# Patient Record
Sex: FEMALE | Race: WHITE | HISPANIC OR LATINO | URBAN - METROPOLITAN AREA
[De-identification: names, ages, dates, MRNs, and addresses within clinical notes are randomized per-mention and may not be internally consistent; named-entity substitution may affect disease eponyms.]

---

## 2017-05-07 ENCOUNTER — EMERGENCY (EMERGENCY)
Facility: HOSPITAL | Age: 32
LOS: 1 days | Discharge: HOME | End: 2017-05-07
Attending: EMERGENCY MEDICINE
Payer: MEDICAID

## 2017-05-07 VITALS
RESPIRATION RATE: 16 BRPM | DIASTOLIC BLOOD PRESSURE: 78 MMHG | SYSTOLIC BLOOD PRESSURE: 116 MMHG | TEMPERATURE: 98 F | HEART RATE: 78 BPM | WEIGHT: 293 LBS | HEIGHT: 64 IN | OXYGEN SATURATION: 99 %

## 2017-05-07 DIAGNOSIS — O34.219 MATERNAL CARE FOR UNSPECIFIED TYPE SCAR FROM PREVIOUS CESAREAN DELIVERY: Chronic | ICD-10-CM

## 2017-05-07 DIAGNOSIS — H66.001 ACUTE SUPPURATIVE OTITIS MEDIA WITHOUT SPONTANEOUS RUPTURE OF EAR DRUM, RIGHT EAR: ICD-10-CM

## 2017-05-07 DIAGNOSIS — J02.9 ACUTE PHARYNGITIS, UNSPECIFIED: ICD-10-CM

## 2017-05-07 PROBLEM — Z00.00 ENCOUNTER FOR PREVENTIVE HEALTH EXAMINATION: Status: ACTIVE | Noted: 2017-05-07

## 2017-05-07 PROCEDURE — 99283 EMERGENCY DEPT VISIT LOW MDM: CPT | Mod: 25

## 2017-05-07 PROCEDURE — 99283 EMERGENCY DEPT VISIT LOW MDM: CPT

## 2017-05-07 RX ORDER — AZITHROMYCIN 500 MG/1
1 TABLET, FILM COATED ORAL
Qty: 6 | Refills: 0 | OUTPATIENT
Start: 2017-05-07 | End: 2017-05-12

## 2017-05-07 RX ORDER — MOMETASONE FUROATE 50 UG/1
2 SPRAY NASAL
Qty: 1 | Refills: 0 | OUTPATIENT
Start: 2017-05-07 | End: 2017-06-06

## 2017-05-07 NOTE — ED STATDOCS - CARE PLAN
Principal Discharge DX:	Pharyngitis, unspecified etiology  Secondary Diagnosis:	Acute suppurative otitis media of right ear without spontaneous rupture of tympanic membrane, recurrence not specified

## 2017-05-07 NOTE — ED STATDOCS - MEDICAL DECISION MAKING DETAILS
sore throat with reduced light reflect to right ear. Will treat with zithromax, as atypical infeciton is likely.

## 2017-07-14 NOTE — ED ADULT NURSE NOTE - CAS ELECT INFOMATION PROVIDED
I will START or STAY ON the medications listed below when I get home from the hospital:    ibuprofen 600 mg oral tablet  -- 1 tab(s) by mouth every 6 hours, As needed, moderate pain  -- Indication: For pain    clonazePAM 0.5 mg oral tablet  -- 1 tab(s) by mouth 3 times a day  -- Indication: For pain    ALPRAZolam 0.5 mg oral tablet  -- 1 tab(s) by mouth 3 times a day  -- Indication: For Anxiety    Iron 100 Plus  --  by mouth   -- Indication: For Anemia I will START or STAY ON the medications listed below when I get home from the hospital:    ibuprofen 600 mg oral tablet  -- 1 tab(s) by mouth every 6 hours, As needed, moderate pain  -- Indication: For pain    clonazePAM 0.5 mg oral tablet  -- 1 tab(s) by mouth 3 times a day  -- Indication: For pain    doxycycline monohydrate 100 mg oral capsule  -- 1 cap(s) by mouth 2 times a day x 5 days  -- Indication: For Antibiotic    ALPRAZolam 0.5 mg oral tablet  -- 1 tab(s) by mouth 3 times a day  -- Indication: For Anxiety    Iron 100 Plus  --  by mouth   -- Indication: For Anemia DC instructions

## 2018-09-11 ENCOUNTER — TRANSCRIPTION ENCOUNTER (OUTPATIENT)
Age: 33
End: 2018-09-11

## 2018-11-27 NOTE — ED ADULT TRIAGE NOTE - HEART RATE (BEATS/MIN)
=================================================================

Del Sum A-C

=================================================================

Datetime Report Generated by CPN: 2018 13:38

   

   

=================================================================

DELIVERY PERSONNEL

=================================================================

   

DELIVERY PERSONNEL:  [id number]

Delivery Doctor::  Sandee Dukes MD

Labor and Delivery Nurse::  Monique Cano RN

Scrub Tech/CNA:  Toño Ahn CNA

Scrub Tech/CNA:  Olu Do CNA

Additional Personnel: :  Julianna Hill, RN

   

=================================================================

MATERNAL INFORMATION

=================================================================

   

Delivery Anesthesia:  None

Medications After Delivery:  Pitocin Drip 20 Units/1000ml NSS

Estimated  Blood Loss (ml):  300

Maternal Complications:  Precipitous Labor (<3hrs); Abruptio Placenta

Complication Details:  partial placental abruption

Provider Comments:  dark clotted blood with passage of placenta c/w a

   possible partial abruption

   

=================================================================

LABOR SUMMARY

=================================================================

   

EDC:  2018 00:00

No. Babies in Womb:  1

 Attempted:  No

Labor Anesthesia:  None

   

=================================================================

LABOR INFORMATION

=================================================================

   

Reason for Induction:  Not Applicable

Onset of Labor:  2018 03:15

Complete Dilatation:  2018 05:43

Oxytocin:  Augmentation

Group B Beta Strep:  unknown

Antibiotics # of Doses:  1

Antibiotics Time of Last Dose:  0316

Name of Antibiotic Given:  Penicillin

Steroids Given:  None

Reason Steroids Not Administered:  Not Applicable

   

=================================================================

MEMBRANES

=================================================================

   

Membranes Rupture Method:  Artificial

Membranes Rupture Method:  Spontaneous

Membranes Rupture Method:  Spontaneous

Rupture of Membranes:  2018 03:15 (Annotations: Per Dr. Dukes

   sve assessment )

Length of Rupture (hr):  2.52

Amniotic Fluid Color:  Clear

Amniotic Fluid Amount:  Small

Amniotic Fluid Odor:  Normal

   

=================================================================

STAGES OF LABOR

=================================================================

   

Stage 1 hr:  2

Stage 1 min:  28

Stage 2 hr:  0

Stage 2 min:  3

Stage 3 hr:  0

Stage 3 min:  4

Total Time in Labor hr:  2

Total Time in Labor min:  35

   

=================================================================

VAGINAL DELIVERY

=================================================================

   

Episiotomy:  None

Laceration #1:  None

Laceration Extension #1:  N/A

Laceration Repair:  Not Applicable

Sponge Count Correct:  N/A

Sharps Count Correct:  N/A

   

=================================================================

CSECTION DELIVERY

=================================================================

   

Primary Indication:  N/A

Secondary Indication:  N/A

CSection Incidence:  N/A

Labor:  N/A

Elective:  N/A

CSection Incision:  N/A

   

=================================================================

BABY A INFORMATION

=================================================================

   

Infant Delivery Date/Time:  2018 05:46

Method of Delivery:  Vaginal

Method of Delivery:  Vaginal

Born in Route :  No

:  N/A

Forceps:  N/A

Vacuum Extraction:  N/A

Shoulder Dystocia :  No

   

=================================================================

PRESENTATION/POSITION BABY A

=================================================================

   

Presentation:  Cephalic

Cephalic Presentation:  Vertex

Vertex Position:  Right Occipital Anterior

Breech Presentation:  N/A

   

=================================================================

PLACENTA INFORMATION BABY A

=================================================================

   

Placenta Delivery Time :  2018 05:50

Placenta Method of Delivery:  Spontaneous

Placenta Method of Delivery:  Spontaneous

Placenta Status:  Delivered

   

=================================================================

APGAR SCORES BABY A

=================================================================

   

Heart Rate 1 min:  >100 bpm

Resp Effort 1 min:  Good Cry

Reflex Irritability 1 min:  Cough or Sneeze or Pulls Away

Muscle Tone 1 min:  Active Motion

Color 1 min:  Blue/Pale

Resuscitation Effort 1 min:  Tactile Stimulation

APGAR SCORE 1 MIN:  8

Heart Rate 5 min:  >100 bpm

Resp Effort 5 min:  Good Cry

Reflex Irritability 5 min:  Cough or Sneeze or Pulls Away

Muscle Tone 5 min:  Active Motion

Color 5 min:  Body Pink, Extremities Blue

APGAR SCORE 5 MIN:  9

   

=================================================================

INFANT INFORMATION BABY A

=================================================================

   

Gestational Age at Delivery:  36.5

Gestational Status:  Late - 34- 36.6 Weeks

Infant Outcome :  Liveborn

Infant Condition :  Stable

Infant Sex:  Male

Infant Sex:  Male

   

=================================================================

IDENTIFICATION BABY A

=================================================================

   

Infant Verification Date/Time:  2018 06:00

ID Band Number:  [id number]

Mother's Name Verified:  Yes

Infant Medical Record Number:  [id number]

RN Verifying Infant:  MARIANNE Cano RN

Additional Verifying Personnel:  RGraham Ahn CNA

   

=================================================================

WEIGHT/LENGTH BABY A

=================================================================

   

Infant Birthweight (gm):  2520

Infant Weight (lb):  5

Infant Weight (oz):  9

Infant Length (in):  18.50

Infant Length (cm):  46.99

   

=================================================================

CORD INFORMATION BABY A

=================================================================

   

No. Cord Vessels:  3

Nuchal Cord :  N/A

Cord Blood Taken:  Yes-For Storage (Mom's Blood type +)

Infant Suction:  None

   

=================================================================

ASSESSMENT BABY A

=================================================================

   

Infant Complications:  Multiple Variable Decels

Physical Findings at Delivery:  Within Normal Limits

Infant Respirations:  Appears Normal

Skin to Skin:  No

Neonatologist/ALS Called :  No

Infant Care By:  MARIANNE Medrano RN

Infant Care By:  MARIANNE Medrano RN

Transferred To:  Remains with Mother

Transferred To:   Nursery

   

=================================================================

BABY B INFORMATION

=================================================================

   

 :  N/A

   

=================================================================

SIGNATURES

=================================================================

   

Signature:  Electronically signed by Sandee Dukes MD ([facility]) on

   2018 at 05:55  with User ID: [email or web address] [id number]

## 2022-01-15 ENCOUNTER — TRANSCRIPTION ENCOUNTER (OUTPATIENT)
Age: 37
End: 2022-01-15

## 2022-02-03 ENCOUNTER — TRANSCRIPTION ENCOUNTER (OUTPATIENT)
Age: 37
End: 2022-02-03

## 2022-02-03 ENCOUNTER — EMERGENCY (EMERGENCY)
Facility: HOSPITAL | Age: 37
LOS: 1 days | Discharge: HOME | End: 2022-02-03
Attending: EMERGENCY MEDICINE
Payer: MEDICAID

## 2022-02-03 VITALS
OXYGEN SATURATION: 98 % | DIASTOLIC BLOOD PRESSURE: 80 MMHG | RESPIRATION RATE: 18 BRPM | HEART RATE: 72 BPM | HEIGHT: 64 IN | SYSTOLIC BLOOD PRESSURE: 133 MMHG | TEMPERATURE: 99 F | WEIGHT: 235.01 LBS

## 2022-02-03 DIAGNOSIS — O34.219 MATERNAL CARE FOR UNSPECIFIED TYPE SCAR FROM PREVIOUS CESAREAN DELIVERY: Chronic | ICD-10-CM

## 2022-02-03 DIAGNOSIS — Z98.84 BARIATRIC SURGERY STATUS: Chronic | ICD-10-CM

## 2022-02-03 LAB
ALBUMIN SERPL ELPH-MCNC: 4 G/DL — SIGNIFICANT CHANGE UP (ref 3.3–5.2)
ALP SERPL-CCNC: 111 U/L — SIGNIFICANT CHANGE UP (ref 40–120)
ALT FLD-CCNC: 33 U/L — HIGH
ANION GAP SERPL CALC-SCNC: 15 MMOL/L — SIGNIFICANT CHANGE UP (ref 5–17)
APPEARANCE UR: CLEAR — SIGNIFICANT CHANGE UP
AST SERPL-CCNC: 22 U/L — SIGNIFICANT CHANGE UP
BACTERIA # UR AUTO: ABNORMAL
BASOPHILS # BLD AUTO: 0.03 K/UL — SIGNIFICANT CHANGE UP (ref 0–0.2)
BASOPHILS NFR BLD AUTO: 0.2 % — SIGNIFICANT CHANGE UP (ref 0–2)
BILIRUB SERPL-MCNC: 0.6 MG/DL — SIGNIFICANT CHANGE UP (ref 0.4–2)
BILIRUB UR-MCNC: NEGATIVE — SIGNIFICANT CHANGE UP
BUN SERPL-MCNC: 13.5 MG/DL — SIGNIFICANT CHANGE UP (ref 8–20)
CALCIUM SERPL-MCNC: 8.6 MG/DL — SIGNIFICANT CHANGE UP (ref 8.6–10.2)
CHLORIDE SERPL-SCNC: 105 MMOL/L — SIGNIFICANT CHANGE UP (ref 98–107)
CO2 SERPL-SCNC: 18 MMOL/L — LOW (ref 22–29)
COLOR SPEC: YELLOW — SIGNIFICANT CHANGE UP
CREAT SERPL-MCNC: 0.88 MG/DL — SIGNIFICANT CHANGE UP (ref 0.5–1.3)
DIFF PNL FLD: ABNORMAL
EOSINOPHIL # BLD AUTO: 0.03 K/UL — SIGNIFICANT CHANGE UP (ref 0–0.5)
EOSINOPHIL NFR BLD AUTO: 0.2 % — SIGNIFICANT CHANGE UP (ref 0–6)
EPI CELLS # UR: ABNORMAL
GLUCOSE SERPL-MCNC: 121 MG/DL — HIGH (ref 70–99)
GLUCOSE UR QL: NEGATIVE MG/DL — SIGNIFICANT CHANGE UP
HCG SERPL-ACNC: <4 MIU/ML — SIGNIFICANT CHANGE UP
HCT VFR BLD CALC: 34.6 % — SIGNIFICANT CHANGE UP (ref 34.5–45)
HGB BLD-MCNC: 11.4 G/DL — LOW (ref 11.5–15.5)
IMM GRANULOCYTES NFR BLD AUTO: 0.4 % — SIGNIFICANT CHANGE UP (ref 0–1.5)
KETONES UR-MCNC: ABNORMAL
LEUKOCYTE ESTERASE UR-ACNC: NEGATIVE — SIGNIFICANT CHANGE UP
LIDOCAIN IGE QN: 22 U/L — SIGNIFICANT CHANGE UP (ref 22–51)
LYMPHOCYTES # BLD AUTO: 1.68 K/UL — SIGNIFICANT CHANGE UP (ref 1–3.3)
LYMPHOCYTES # BLD AUTO: 11.9 % — LOW (ref 13–44)
MCHC RBC-ENTMCNC: 26.9 PG — LOW (ref 27–34)
MCHC RBC-ENTMCNC: 32.9 GM/DL — SIGNIFICANT CHANGE UP (ref 32–36)
MCV RBC AUTO: 81.6 FL — SIGNIFICANT CHANGE UP (ref 80–100)
MONOCYTES # BLD AUTO: 0.63 K/UL — SIGNIFICANT CHANGE UP (ref 0–0.9)
MONOCYTES NFR BLD AUTO: 4.5 % — SIGNIFICANT CHANGE UP (ref 2–14)
NEUTROPHILS # BLD AUTO: 11.68 K/UL — HIGH (ref 1.8–7.4)
NEUTROPHILS NFR BLD AUTO: 82.8 % — HIGH (ref 43–77)
NITRITE UR-MCNC: NEGATIVE — SIGNIFICANT CHANGE UP
PH UR: 6 — SIGNIFICANT CHANGE UP (ref 5–8)
PLATELET # BLD AUTO: 281 K/UL — SIGNIFICANT CHANGE UP (ref 150–400)
POTASSIUM SERPL-MCNC: 3.6 MMOL/L — SIGNIFICANT CHANGE UP (ref 3.5–5.3)
POTASSIUM SERPL-SCNC: 3.6 MMOL/L — SIGNIFICANT CHANGE UP (ref 3.5–5.3)
PROT SERPL-MCNC: 7 G/DL — SIGNIFICANT CHANGE UP (ref 6.6–8.7)
PROT UR-MCNC: NEGATIVE — SIGNIFICANT CHANGE UP
RBC # BLD: 4.24 M/UL — SIGNIFICANT CHANGE UP (ref 3.8–5.2)
RBC # FLD: 13.2 % — SIGNIFICANT CHANGE UP (ref 10.3–14.5)
RBC CASTS # UR COMP ASSIST: ABNORMAL /HPF (ref 0–4)
SODIUM SERPL-SCNC: 138 MMOL/L — SIGNIFICANT CHANGE UP (ref 135–145)
SP GR SPEC: 1.02 — SIGNIFICANT CHANGE UP (ref 1.01–1.02)
UROBILINOGEN FLD QL: NEGATIVE MG/DL — SIGNIFICANT CHANGE UP
WBC # BLD: 14.1 K/UL — HIGH (ref 3.8–10.5)
WBC # FLD AUTO: 14.1 K/UL — HIGH (ref 3.8–10.5)
WBC UR QL: SIGNIFICANT CHANGE UP /HPF (ref 0–5)

## 2022-02-03 PROCEDURE — 99285 EMERGENCY DEPT VISIT HI MDM: CPT

## 2022-02-03 RX ORDER — SODIUM CHLORIDE 9 MG/ML
1000 INJECTION INTRAMUSCULAR; INTRAVENOUS; SUBCUTANEOUS ONCE
Refills: 0 | Status: COMPLETED | OUTPATIENT
Start: 2022-02-03 | End: 2022-02-03

## 2022-02-03 RX ORDER — MORPHINE SULFATE 50 MG/1
4 CAPSULE, EXTENDED RELEASE ORAL ONCE
Refills: 0 | Status: DISCONTINUED | OUTPATIENT
Start: 2022-02-03 | End: 2022-02-03

## 2022-02-03 RX ORDER — KETOROLAC TROMETHAMINE 30 MG/ML
15 SYRINGE (ML) INJECTION ONCE
Refills: 0 | Status: DISCONTINUED | OUTPATIENT
Start: 2022-02-03 | End: 2022-02-03

## 2022-02-03 RX ORDER — ONDANSETRON 8 MG/1
4 TABLET, FILM COATED ORAL ONCE
Refills: 0 | Status: COMPLETED | OUTPATIENT
Start: 2022-02-03 | End: 2022-02-03

## 2022-02-03 RX ADMIN — MORPHINE SULFATE 4 MILLIGRAM(S): 50 CAPSULE, EXTENDED RELEASE ORAL at 21:28

## 2022-02-03 RX ADMIN — ONDANSETRON 4 MILLIGRAM(S): 8 TABLET, FILM COATED ORAL at 21:49

## 2022-02-03 RX ADMIN — ONDANSETRON 4 MILLIGRAM(S): 8 TABLET, FILM COATED ORAL at 21:28

## 2022-02-03 RX ADMIN — SODIUM CHLORIDE 1000 MILLILITER(S): 9 INJECTION INTRAMUSCULAR; INTRAVENOUS; SUBCUTANEOUS at 21:28

## 2022-02-03 RX ADMIN — Medication 15 MILLIGRAM(S): at 21:49

## 2022-02-03 NOTE — ED PROVIDER NOTE - PATIENT PORTAL LINK FT
You can access the FollowMyHealth Patient Portal offered by [facility] by registering at the following website: [email or web address]. By joining [facility]’s FollowMyHealth portal, you will also be able to view your health information using other applications (apps) compatible with our system.

## 2022-02-03 NOTE — ED PROVIDER NOTE - PHYSICAL EXAMINATION
Constitutional: Awake, Alert. NAD. uncomfortable due to pain. Cooperative. VSS.  HEAD: NC/AT; no signs of trauma   EYES: Conjunctiva and sclera are clear bilaterally.   NECK: FROM.  CARDIOVASCULAR: RRR.  RESPIRATORY: Speaking full sentences. Normal respiratory effort  ABDOMEN: Soft; +ttp LLQ +L CVAT. +elevated bmi  EXTREMITIES: Full active ROM in all extremities; no deformities; non-tender to palpation; no edema, no crepitus or step off;  distal pulses palpable and symmetric.  SKIN: Warm, dry; good skin turgor, no apparent lesions or rashes, no ecchymosis, brisk capillary refill.  NEURO: AAOx3 follows commands. No facial droop. CN 2-12 grossly intact. No truncal ataxia. Steady gait. Moving all extrem spont

## 2022-02-03 NOTE — ED PROVIDER NOTE - OBJECTIVE STATEMENT
37 yo F PMH gastric sleeve, gastric bypass, C sections x2, nephrolithiasis dx'ed 2 weeks ago at [facility] with UTI s/p keflex regimen and  f/u yesterday p/w worsening left flank pain radiating to LLQ x today a/w nausea and nbnb vomiting. admits chronic diarrhea since bypass surgery. denies fever, cp, sob.

## 2022-02-03 NOTE — ED PROVIDER NOTE - NSFOLLOWUPINSTRUCTIONS_ED_ALL_ED_FT
- Please follow up with your Primary Care Doctor in 24-48 hr.  - Seek immediate medical care for any new, worsening or concerning signs or symptoms.   - Stay well hydrated, drink lots of liquids   - Take medications as directed, be sure to read all instructions on packaging     - Take ibuprofen (Motrin) 600mg every 6 hours for pain     - Take Tylenol 650 mg every 6 hours for pain      - Take oxycodone 5 mg every 6 hours for severe pain  - You were given copies of all your test results, please bring to your primary care doctor for review of any abnormal test results  - Follow up with Urologist in 1-2 days for KIDNEY STONE  - Strain your urine and bring stone to urologist for pathologic evaluation.   Feel better!     Kidney Stones    Kidney stones (urolithiasis) are crystal deposits that form inside your kidneys. Pain is caused by the stone moving through the urinary tract, causing spasms of the ureter. Drink enough water and fluids to keep your urine clear or pale yellow. This will help you to pass the stone or stone fragments. If provided a strainer, strain all urine and keep all particulate matter and stones for a follow up appointment with a urologist.    SEEK IMMEDIATE MEDICAL CARE IF YOU HAVE ANY OF THE FOLLOWING SYMPTOMS: pain not controlled with medication, fever/chills, worsening vomiting, inability to urinate, or dizziness/lightheadedness.

## 2022-02-03 NOTE — ED PROVIDER NOTE - ATTENDING CONTRIBUTION TO CARE
I performed the initial face to face bedside interview with this patient regarding history of present illness, review of symptoms and past medical, social and family history.  I completed an independent physical examination.  I was the initial provider who evaluated this patient.  I have signed out the follow up of any pending tests (i.e. labs, radiological studies) to the ACP.  I have discussed the patient’s plan of care and disposition with the ACP.

## 2022-02-03 NOTE — ED PROVIDER NOTE - NSICDXPASTSURGICALHX_GEN_ALL_CORE_FT
PAST SURGICAL HISTORY:  Delivery with history of       PAST SURGICAL HISTORY:  Delivery with history of      S/P gastric bypass

## 2022-02-03 NOTE — ED PROVIDER NOTE - CLINICAL SUMMARY MEDICAL DECISION MAKING FREE TEXT BOX
37 yo F pmh nephrolithiasis with uti dx'ed 2 weeks ago at [facility] s/p keflex regimen and  f/u yesterday p/w worsening left flank pain a/w nausea, vom x today  vss  +L CVAT and llq ttp    will get labs, ua, ct, analgesia, antiemetics, iv fluids, reassess  consider nephrolithiasis, uti, hydronephrosis Discharged

## 2022-02-03 NOTE — ED PROVIDER NOTE - PROGRESS NOTE DETAILS
MEI Frausto NOTE: Pt evaluated at bedside.... Pt evaluated prior by intake physician. Otherwise HPI/PE/ROS as noted above. Will follow up plan per intake physician. MEI Frausto NOTE: Pt evaluated at bedside. Pt with left flank pain. Pt saw her urologist Dr. Pink yesterday was scheduled for an US and possible future lithotripsy. 2 weeks ago at [place] pt had 2mm stone in left ureter. Denies fever, hematuria.   Pt evaluated prior by intake physician. Otherwise HPI/PE/ROS as noted above. Will follow up plan per intake physician. MEI Frausto NOTE: CT shows "4 x 2 mm obstructing left ureteropelvic junction calculus with associated   mild left hydronephrosis." Reviewed all results and plan; abd soft NTND no rebound or guarding  Pt stable for d/c, reports improvement, VSS, tolerating PO, ambulatory.  Discussion includes results, plan, proper medication use/side effects, and return precautions. Pt advised to f/u with PMD 1-2 days and specialists discussed.  Printed copies of available lab/radiology results contained within discharge packet. Pt verbalized understanding/agreement of plan.

## 2022-02-03 NOTE — ED ADULT NURSE NOTE - OBJECTIVE STATEMENT
Assumed care of pt in ST. Pt A&Ox3 c/o BL flank pain x2 weeks. Pt states she was evaluated at [facility] and dx'ed w/ 2 kidney stones, one in bladder and one in uriter. Pt states she passed one, however is experiencing increased pain and believes she is passing the second one. Pt endorse painful urination, N/V.

## 2022-02-03 NOTE — ED PROVIDER NOTE - NSICDXPASTMEDICALHX_GEN_ALL_CORE_FT
PAST MEDICAL HISTORY:  No pertinent past medical history      PAST MEDICAL HISTORY:  Nephrolithiasis

## 2022-02-04 VITALS
HEART RATE: 63 BPM | RESPIRATION RATE: 16 BRPM | OXYGEN SATURATION: 100 % | SYSTOLIC BLOOD PRESSURE: 121 MMHG | TEMPERATURE: 98 F | DIASTOLIC BLOOD PRESSURE: 68 MMHG

## 2022-02-04 LAB
CULTURE RESULTS: SIGNIFICANT CHANGE UP
SPECIMEN SOURCE: SIGNIFICANT CHANGE UP

## 2022-02-04 PROCEDURE — 96375 TX/PRO/DX INJ NEW DRUG ADDON: CPT

## 2022-02-04 PROCEDURE — 96374 THER/PROPH/DIAG INJ IV PUSH: CPT

## 2022-02-04 PROCEDURE — 83690 ASSAY OF LIPASE: CPT

## 2022-02-04 PROCEDURE — 87086 URINE CULTURE/COLONY COUNT: CPT

## 2022-02-04 PROCEDURE — 85025 COMPLETE CBC W/AUTO DIFF WBC: CPT

## 2022-02-04 PROCEDURE — 74176 CT ABD & PELVIS W/O CONTRAST: CPT | Mod: 26,MA

## 2022-02-04 PROCEDURE — 81001 URINALYSIS AUTO W/SCOPE: CPT

## 2022-02-04 PROCEDURE — 80053 COMPREHEN METABOLIC PANEL: CPT

## 2022-02-04 PROCEDURE — 74176 CT ABD & PELVIS W/O CONTRAST: CPT | Mod: MA

## 2022-02-04 PROCEDURE — 84702 CHORIONIC GONADOTROPIN TEST: CPT

## 2022-02-04 PROCEDURE — 99284 EMERGENCY DEPT VISIT MOD MDM: CPT | Mod: 25

## 2022-02-04 PROCEDURE — 36415 COLL VENOUS BLD VENIPUNCTURE: CPT

## 2022-02-04 RX ORDER — OXYCODONE HYDROCHLORIDE 5 MG/1
1 TABLET ORAL
Qty: 12 | Refills: 0
Start: 2022-02-04 | End: 2022-02-06

## 2022-02-04 RX ORDER — OXYCODONE HYDROCHLORIDE 5 MG/1
5 TABLET ORAL ONCE
Refills: 0 | Status: DISCONTINUED | OUTPATIENT
Start: 2022-02-04 | End: 2022-02-04

## 2022-02-04 RX ORDER — TAMSULOSIN HYDROCHLORIDE 0.4 MG/1
1 CAPSULE ORAL
Qty: 5 | Refills: 0
Start: 2022-02-04 | End: 2022-02-08

## 2022-02-04 RX ORDER — ONDANSETRON 8 MG/1
1 TABLET, FILM COATED ORAL
Qty: 9 | Refills: 0
Start: 2022-02-04 | End: 2022-02-06

## 2022-02-04 RX ADMIN — OXYCODONE HYDROCHLORIDE 5 MILLIGRAM(S): 5 TABLET ORAL at 01:23

## 2022-08-26 PROBLEM — N20.0 CALCULUS OF KIDNEY: Chronic | Status: ACTIVE | Noted: 2022-02-03

## 2022-09-06 DIAGNOSIS — M25.562 PAIN IN LEFT KNEE: ICD-10-CM

## 2022-09-08 ENCOUNTER — APPOINTMENT (OUTPATIENT)
Dept: ORTHOPEDIC SURGERY | Facility: CLINIC | Age: 37
End: 2022-09-08

## 2022-09-08 VITALS
WEIGHT: 232 LBS | SYSTOLIC BLOOD PRESSURE: 144 MMHG | HEIGHT: 63 IN | BODY MASS INDEX: 41.11 KG/M2 | DIASTOLIC BLOOD PRESSURE: 85 MMHG | HEART RATE: 78 BPM

## 2022-09-08 DIAGNOSIS — M22.2X9 PATELLOFEMORAL DISORDERS, UNSPECIFIED KNEE: ICD-10-CM

## 2022-09-08 PROCEDURE — 99203 OFFICE O/P NEW LOW 30 MIN: CPT

## 2022-09-08 PROCEDURE — 73562 X-RAY EXAM OF KNEE 3: CPT | Mod: LT

## 2022-09-08 RX ORDER — NAPROXEN 500 MG/1
500 TABLET ORAL TWICE DAILY
Qty: 60 | Refills: 0 | Status: ACTIVE | COMMUNITY
Start: 2022-09-08 | End: 1900-01-01

## 2022-09-08 NOTE — PHYSICAL EXAM
[de-identified] : GENERAL APPEARANCE: Well nourished and hydrated, pleasant, alert, and oriented x 3. Appears their stated age. \par HEENT: Normocephalic, extraocular eye motion intact. Nasal septum midline. Oral cavity clear. External auditory canal clear. \par RESPIRATORY: Breath sounds clear and audible in all lobes. No wheezing, No accessory muscle use.\par CARDIOVASCULAR: No apparent abnormalities. No lower leg edema. No varicosities. Pedal pulses are palpable.\par NEUROLOGIC: Sensation is normal, no muscle weakness in the upper or lower extremities.\par DERMATOLOGIC: No apparent skin lesions, moist, warm, no rash.\par SPINE: Cervical spine appears normal and moves freely; thoracic spine appears normal and moves freely; lumbosacral spine appears normal and moves freely, normal, nontender.\par MUSCULOSKELETAL: Hands, wrists, and elbows are normal and move freely, shoulders are normal and move freely. \par Psychiatric: Oriented to person, place, and time, insight and judgement were intact and the affect was normal. \par Musculoskeletal:. Left knee exam shows no effusion, ROM is 0-1 30 degrees, no instability, no pain with Dorita, no joint line tenderness.  Negative patellar grind\par 5/5 motor strength in bilateral lower extremities. Sensory: Intact in bilateral lower extremities. DTRs: Biceps, brachioradialis, triceps, patellar, ankle and plantar 2+ and symmetric bilaterally. Pulses: dorsalis pedis, posterior tibial, femoral, popliteal, and radial 2+ and symmetric bilaterally. \par Constitutional: Alert and in no acute distress, but well-appearing.  [de-identified] : 3 views of the left knee obtained the office today show no acute fracture or dislocation.  No significant degenerative changes noted.

## 2022-09-08 NOTE — HISTORY OF PRESENT ILLNESS
[Improving] : improving [5] : a current pain level of 5/10 [6] : an average pain level of 6/10 [8] : a maximum pain level of 8/10 [Standing] : standing [Intermit.] : ~He/She~ states the symptoms seem to be intermittent [Bending] : worsened by bending [Sitting] : worsened by sitting [Walking] : worsened by walking [Knee Extension] : worsened with knee extension [Acetaminophen] : relieved by acetaminophen [NSAIDs] : relieved by nonsteroidal anti-inflammatory drugs [Rest] : relieved by rest [de-identified] : 37-year-old female with no significant past medical history and past surgical history of bariatric surgery presents to the office for initial evaluation of left knee pain.  Patient states about a month ago she was bringing in her groceries when she felt a sharp pain in her left knee.  Does not recall falling or twisting her knee.  She states that since that day she has had intermittent pain behind her kneecap.  Pain is exacerbated by walking, ascending/descending stairs, rising from a seated position, and straightening her leg.  She has been seeing her primary care doctor who has prescribed her naproxen which provides good relief of the pain.  She has never been to physical therapy denies any history of injections in the knees or surgeries.  Denies radiation of the pain to the hip, numbness/tingling, locking, clicking, buckling of the left knee. [Direct Pressure] : not worsened by direct pressure [Knee Flexion] : not worsened by knee flexion

## 2022-09-08 NOTE — DISCUSSION/SUMMARY
[Medication Risks Reviewed] : Medication risks reviewed [de-identified] : Patient is a 37-year-old female presenting today for evaluation of left anterior knee pain.  She does not have any concerning signs on exam or history.  I do think that she has patellofemoral disorder.  I therefore recommended conservative treatment at this time.  I have given her prescription for naproxen.  I recommended physical therapy and given a prescription for that.  I will see her back on an as-needed basis for her left knee.  All questions were asked and answered.